# Patient Record
Sex: FEMALE | Race: WHITE | Employment: UNEMPLOYED | URBAN - METROPOLITAN AREA
[De-identification: names, ages, dates, MRNs, and addresses within clinical notes are randomized per-mention and may not be internally consistent; named-entity substitution may affect disease eponyms.]

---

## 2019-02-16 ENCOUNTER — HOSPITAL ENCOUNTER (OUTPATIENT)
Age: 59
Discharge: HOME | End: 2019-02-16

## 2021-11-25 ENCOUNTER — APPOINTMENT (OUTPATIENT)
Dept: CT IMAGING | Age: 61
End: 2021-11-25
Payer: COMMERCIAL

## 2021-11-25 ENCOUNTER — APPOINTMENT (OUTPATIENT)
Dept: GENERAL RADIOLOGY | Age: 61
End: 2021-11-25
Payer: COMMERCIAL

## 2021-11-25 ENCOUNTER — HOSPITAL ENCOUNTER (EMERGENCY)
Age: 61
Discharge: HOME | End: 2021-11-25
Payer: COMMERCIAL

## 2021-11-25 VITALS
RESPIRATION RATE: 16 BRPM | SYSTOLIC BLOOD PRESSURE: 146 MMHG | OXYGEN SATURATION: 100 % | WEIGHT: 110 LBS | HEIGHT: 66 IN | TEMPERATURE: 98.2 F | DIASTOLIC BLOOD PRESSURE: 93 MMHG | HEART RATE: 78 BPM | BODY MASS INDEX: 17.68 KG/M2

## 2021-11-25 DIAGNOSIS — K59.00 CONSTIPATION, UNSPECIFIED CONSTIPATION TYPE: ICD-10-CM

## 2021-11-25 DIAGNOSIS — F03.91 DEMENTIA WITH BEHAVIORAL DISTURBANCE, UNSPECIFIED DEMENTIA TYPE: ICD-10-CM

## 2021-11-25 DIAGNOSIS — R41.82 ALTERED MENTAL STATUS, UNSPECIFIED ALTERED MENTAL STATUS TYPE: Primary | ICD-10-CM

## 2021-11-25 DIAGNOSIS — R31.9 HEMATURIA, UNSPECIFIED TYPE: ICD-10-CM

## 2021-11-25 LAB
ACETAMINOPHEN LEVEL: <5 UG/ML (ref 15–30)
ALBUMIN SERPL-MCNC: 4.6 GM/DL (ref 3.4–5)
ALCOHOL SCREEN SERUM: <0.01 %WT/VOL
ALP BLD-CCNC: 52 IU/L (ref 40–128)
ALT SERPL-CCNC: 15 U/L (ref 10–40)
AMPHETAMINES: NEGATIVE
ANION GAP SERPL CALCULATED.3IONS-SCNC: 12 MMOL/L (ref 4–16)
AST SERPL-CCNC: 25 IU/L (ref 15–37)
BACTERIA: NEGATIVE /HPF
BARBITURATE SCREEN URINE: NEGATIVE
BASE EXCESS MIXED: 3.4 (ref 0–2.3)
BASOPHILS ABSOLUTE: 0.1 K/CU MM
BASOPHILS RELATIVE PERCENT: 0.7 % (ref 0–1)
BENZODIAZEPINE SCREEN, URINE: NEGATIVE
BILIRUB SERPL-MCNC: 0.5 MG/DL (ref 0–1)
BILIRUBIN URINE: NEGATIVE MG/DL
BLOOD, URINE: ABNORMAL
BUN BLDV-MCNC: 18 MG/DL (ref 6–23)
CALCIUM SERPL-MCNC: 9.4 MG/DL (ref 8.3–10.6)
CANNABINOID SCREEN URINE: NEGATIVE
CHLORIDE BLD-SCNC: 101 MMOL/L (ref 99–110)
CLARITY: CLEAR
CO2: 27 MMOL/L (ref 21–32)
COCAINE METABOLITE: NEGATIVE
COLOR: YELLOW
COMMENT: ABNORMAL
CREAT SERPL-MCNC: 0.8 MG/DL (ref 0.6–1.1)
DIFFERENTIAL TYPE: ABNORMAL
DOSE AMOUNT: ABNORMAL
DOSE AMOUNT: ABNORMAL
DOSE TIME: ABNORMAL
DOSE TIME: ABNORMAL
EKG ATRIAL RATE: 79 BPM
EKG DIAGNOSIS: NORMAL
EKG P-R INTERVAL: 114 MS
EKG Q-T INTERVAL: 356 MS
EKG QRS DURATION: 82 MS
EKG QTC CALCULATION (BAZETT): 408 MS
EKG R AXIS: 131 DEGREES
EKG T AXIS: 144 DEGREES
EKG VENTRICULAR RATE: 79 BPM
EOSINOPHILS ABSOLUTE: 0.1 K/CU MM
EOSINOPHILS RELATIVE PERCENT: 1.3 % (ref 0–3)
GFR AFRICAN AMERICAN: >60 ML/MIN/1.73M2
GFR NON-AFRICAN AMERICAN: >60 ML/MIN/1.73M2
GLUCOSE BLD-MCNC: 95 MG/DL (ref 70–99)
GLUCOSE, URINE: NEGATIVE MG/DL
HCO3 VENOUS: 28.5 MMOL/L (ref 19–25)
HCT VFR BLD CALC: 43 % (ref 37–47)
HEMOGLOBIN: 13.9 GM/DL (ref 12.5–16)
IMMATURE NEUTROPHIL %: 0.6 % (ref 0–0.43)
KETONES, URINE: NEGATIVE MG/DL
LACTATE: 2 MMOL/L (ref 0.4–2)
LEUKOCYTE ESTERASE, URINE: NEGATIVE
LYMPHOCYTES ABSOLUTE: 1.9 K/CU MM
LYMPHOCYTES RELATIVE PERCENT: 27.2 % (ref 24–44)
MCH RBC QN AUTO: 31.4 PG (ref 27–31)
MCHC RBC AUTO-ENTMCNC: 32.3 % (ref 32–36)
MCV RBC AUTO: 97.3 FL (ref 78–100)
MONOCYTES ABSOLUTE: 0.4 K/CU MM
MONOCYTES RELATIVE PERCENT: 5.6 % (ref 0–4)
MUCUS: ABNORMAL HPF
NITRITE URINE, QUANTITATIVE: NEGATIVE
NUCLEATED RBC %: 0 %
O2 SAT, VEN: 60.8 % (ref 50–70)
OPIATES, URINE: NEGATIVE
OXYCODONE: NEGATIVE
PCO2, VEN: 44 MMHG (ref 38–52)
PDW BLD-RTO: 12 % (ref 11.7–14.9)
PH VENOUS: 7.42 (ref 7.32–7.42)
PH, URINE: 8 (ref 5–8)
PHENCYCLIDINE, URINE: NEGATIVE
PLATELET # BLD: 263 K/CU MM (ref 140–440)
PMV BLD AUTO: 8.3 FL (ref 7.5–11.1)
PO2, VEN: 31 MMHG (ref 28–48)
POTASSIUM SERPL-SCNC: 4.5 MMOL/L (ref 3.5–5.1)
PROTEIN UA: NEGATIVE MG/DL
RBC # BLD: 4.42 M/CU MM (ref 4.2–5.4)
RBC URINE: 1 /HPF (ref 0–6)
SALICYLATE LEVEL: <0.3 MG/DL (ref 15–30)
SARS-COV-2, NAAT: NOT DETECTED
SEGMENTED NEUTROPHILS ABSOLUTE COUNT: 4.5 K/CU MM
SEGMENTED NEUTROPHILS RELATIVE PERCENT: 64.6 % (ref 36–66)
SODIUM BLD-SCNC: 140 MMOL/L (ref 135–145)
SOURCE: NORMAL
SPECIFIC GRAVITY UA: 1.01 (ref 1–1.03)
SQUAMOUS EPITHELIAL: <1 /HPF
T4 FREE: 1 NG/DL (ref 0.9–1.8)
TOTAL IMMATURE NEUTOROPHIL: 0.04 K/CU MM
TOTAL NUCLEATED RBC: 0 K/CU MM
TOTAL PROTEIN: 7.5 GM/DL (ref 6.4–8.2)
TRICHOMONAS: ABNORMAL /HPF
TROPONIN T: <0.01 NG/ML
TSH HIGH SENSITIVITY: 4.93 UIU/ML (ref 0.27–4.2)
UROBILINOGEN, URINE: NEGATIVE MG/DL (ref 0.2–1)
WBC # BLD: 7 K/CU MM (ref 4–10.5)
WBC UA: 1 /HPF (ref 0–5)

## 2021-11-25 PROCEDURE — 70450 CT HEAD/BRAIN W/O DYE: CPT

## 2021-11-25 PROCEDURE — 87040 BLOOD CULTURE FOR BACTERIA: CPT

## 2021-11-25 PROCEDURE — 84439 ASSAY OF FREE THYROXINE: CPT

## 2021-11-25 PROCEDURE — 87081 CULTURE SCREEN ONLY: CPT

## 2021-11-25 PROCEDURE — 85025 COMPLETE CBC W/AUTO DIFF WBC: CPT

## 2021-11-25 PROCEDURE — 84443 ASSAY THYROID STIM HORMONE: CPT

## 2021-11-25 PROCEDURE — 82805 BLOOD GASES W/O2 SATURATION: CPT

## 2021-11-25 PROCEDURE — 80053 COMPREHEN METABOLIC PANEL: CPT

## 2021-11-25 PROCEDURE — 36415 COLL VENOUS BLD VENIPUNCTURE: CPT

## 2021-11-25 PROCEDURE — G0480 DRUG TEST DEF 1-7 CLASSES: HCPCS

## 2021-11-25 PROCEDURE — 84484 ASSAY OF TROPONIN QUANT: CPT

## 2021-11-25 PROCEDURE — 93005 ELECTROCARDIOGRAM TRACING: CPT | Performed by: PHYSICIAN ASSISTANT

## 2021-11-25 PROCEDURE — 81001 URINALYSIS AUTO W/SCOPE: CPT

## 2021-11-25 PROCEDURE — 71250 CT THORAX DX C-: CPT

## 2021-11-25 PROCEDURE — 74176 CT ABD & PELVIS W/O CONTRAST: CPT

## 2021-11-25 PROCEDURE — 93010 ELECTROCARDIOGRAM REPORT: CPT | Performed by: INTERNAL MEDICINE

## 2021-11-25 PROCEDURE — 87430 STREP A AG IA: CPT

## 2021-11-25 PROCEDURE — 82375 ASSAY CARBOXYHB QUANT: CPT

## 2021-11-25 PROCEDURE — 80307 DRUG TEST PRSMV CHEM ANLYZR: CPT

## 2021-11-25 PROCEDURE — 71045 X-RAY EXAM CHEST 1 VIEW: CPT

## 2021-11-25 PROCEDURE — 83605 ASSAY OF LACTIC ACID: CPT

## 2021-11-25 PROCEDURE — 99285 EMERGENCY DEPT VISIT HI MDM: CPT

## 2021-11-25 PROCEDURE — 87635 SARS-COV-2 COVID-19 AMP PRB: CPT

## 2021-11-25 NOTE — ED PROVIDER NOTES
EMERGENCY DEPARTMENT [facility] EMERGENCY DEPARTMENT        TRIAGE CHIEF COMPLAINT:   Altered Mental Status      [place]:  Kelsey Carter is a 62 y.o. female that presents with EMS report of altered mental status. Per EMS patient had called 911 for a report of \"weird smell. \" Police were dispatched to the patient's home but were not able to find any abnormalities but patient was reportedly acting altered, having difficulty answering questions appropriately. EMS were dispatched and per EMS, patient was not able to answer any questions, had garbled speech. Patient's father was present at time of EMS arrival and had endorsed that patient is following with neurology in [place] but was not able to give them further details. On ED arrival, patient is self ambulatory to the room. On my evaluation, she is pleasant but confused, attempting to answer questions with intermittent expressive aphasia and difficulty word finding. She is alert and oriented to place and self but not to time. When asked what month is it is she responds \"Friday. \"  When asked what holiday is today she responds \"this week. \"  She often says \"I know the word but can't think of it. \"  I did call and speak with patient's father over the phone. Father states for the last year, patient has had progressively worsening confusion, similar to today's presentation. She has been seen by [facility] neurology and had an MRI but father was not aware of any abnormalities, they are awaiting insurance approval for a PET scan. Father states patient has also been seen by local psychiatry for possible early onset dementia. Father states patient is not on any medications at this time. She has noticed that patient's urine appears more cloudy and foul-smelling over the last several days. No fall or recent fever/chills, cough, changes in appetite. No anticoagulation use.   Father does state patient's confusion and difficulty word finding expressive aphasia intermittently is the same over the last 2-year without new or worsening features. Patient does endorse to me that she is having sore throat pain described as burning. \"  No difficulty swallowing at this time. She states \"maybe it was something I swallow. \"  She cannot recall calling 911 for her abnormal smell concerns initially. She is denying any shortness of breath, chest pain or abdominal pain. Father states that he has been evaluated by neurology as well as psychiatry without a definitive diagnosis for her worsening confusion over the last year. ROS: Limited from patient given her baseline confusion, provided by father. General:  No fevers   Cardiovascular:  No chest pain   Respiratory:  No shortness of breath, no cough   Gastrointestinal:  No pain,  no vomiting, no diarrhea  Musculoskeletal:  No muscle pain, no joint pain  Skin:  No rash   Neurologic: See HPI  Psychiatric:  No anxiety  Genitourinary:  See HPI  Extremities:  No edema    History reviewed. No pertinent past medical history. History reviewed. No pertinent surgical history. History reviewed. No pertinent family history.   Social History     Socioeconomic History    Marital status: Single     Spouse name: Not on file    Number of children: Not on file    Years of education: Not on file    Highest education level: Not on file   Occupational History    Not on file   Tobacco Use    Smoking status: Never Smoker    Smokeless tobacco: Never Used   Substance and Sexual Activity    Alcohol use: Not Currently    Drug use: Never    Sexual activity: Not on file   Other Topics Concern    Not on file   Social History Narrative    Not on file     Social Determinants of Health     Financial Resource Strain:     Difficulty of Paying Living Expenses: Not on file   Food Insecurity:     Worried About Running Out of Food in the Last Year: Not on file    Janey of Food in the Last Year: Not on file Transportation Needs:     Lack of Transportation (Medical): Not on file    Lack of Transportation (Non-Medical): Not on file   Physical Activity:     Days of Exercise per Week: Not on file    Minutes of Exercise per Session: Not on file   Stress:     Feeling of Stress : Not on file   Social Connections:     Frequency of Communication with Friends and Family: Not on file    Frequency of Social Gatherings with Friends and Family: Not on file    Attends Judaism Services: Not on file    Active Member of [address] [place] [address] [employer] or Organizations: Not on file    Attends Club or Organization Meetings: Not on file    Marital Status: Not on file   Intimate Partner Violence:     Fear of Current or Ex-Partner: Not on file    Emotionally Abused: Not on file    Physically Abused: Not on file    Sexually Abused: Not on file   Housing Stability:     Unable to Pay for Housing in the Last Year: Not on file    Number of [place] in the Last Year: Not on file    Unstable Housing in the Last Year: Not on file     No current facility-administered medications for this encounter. No current outpatient medications on file. No Known Allergies    Nursing Notes Reviewed  PHYSICAL EXAM    VITAL SIGNS: BP (!) 146/93   Pulse 78   Temp 98.2 °F (36.8 °C) (Oral)   Resp 16   Ht 5' 6\" (1.676 m)   Wt 110 lb (49.9 kg)   SpO2 100%   BMI 17.75 kg/m²    Constitutional:  Well developed, Well nourished, In no acute distress  Head:  Normocephalic, Atraumatic  Eyes: PERRL. EOMI. No nystagmus. Sclera clear. Conjunctiva normal, No discharge. Neck/Lymphatics: Supple, no JVD, Trachea is midline  Cardiovascular:  RRR, Normal S1 & S2     Peripheral Vascular: Distal pulses 2+, Capillary refill <2seconds  Respiratory:  Respirations nonnlabored, Clear to auscultation bilaterally, No retractions  Abdomen: Bowel sounds normal in all quadrants, Soft, Non tender/Nondistended, No palpable abdominal masses.   Musculoskeletal: No edema, No tenderness, No cyanosis, 5/5 strength bilaterally, BUE/BLE symmetrical without atrophy or deformities  Integument:  Warm, Dry, Intact, Skin turgor and texture normal  Neurologic:  Alert & oriented to self and place only, not to time. When asked what month and year it is she responds \"Friday. \"  When asked what holiday is today she responds \"this week. \"  Intermittent expressive aphasia and difficulty word finding and jumbled speech mixed with otherwise clear speech and clear answers. She does state \"I know where I am, I'm in the hospital because I called for them to bring me here\" but cannot recall why she initially called 911. No focal deficits noted. Cranial nerves II through XII grossly intact. No slurred speech. No facial droop. Finger to nose intact, rapid alternating movements intact. Normal gross motor coordination & motor strength bilateral upper and lower extremities. Upper and Lower extremities DTRs intact. Sensation intact. No pronator drift.  No gait ataxia  Psychiatric:  Affect appropriate      I have reviewed and interpreted all of the currently available lab results from this visit (if applicable):  Results for orders placed or performed during the hospital encounter of 11/25/21   COVID-19, Rapid    Specimen: Nasopharyngeal   Result Value Ref Range    Source THROAT     SARS-CoV-2, NAAT NOT DETECTED NOT DETECTED   Strep Screen Group A Throat    Specimen: Throat   Result Value Ref Range    Specimen THROAT     Special Requests NONE     Strep A Direct Screen NEGATIVE    CBC auto diff   Result Value Ref Range    WBC 7.0 4.0 - 10.5 K/CU MM    RBC 4.42 4.2 - 5.4 M/CU MM    Hemoglobin 13.9 12.5 - 16.0 GM/DL    Hematocrit 43.0 37 - 47 %    MCV 97.3 78 - 100 FL    MCH 31.4 (H) 27 - 31 PG    MCHC 32.3 32.0 - 36.0 %    RDW 12.0 11.7 - 14.9 %    Platelets 173 878 - 149 K/CU MM    MPV 8.3 7.5 - 11.1 FL    Differential Type AUTOMATED DIFFERENTIAL     Segs Relative 64.6 36 - 66 %    Lymphocytes % 27.2 24 - 44 % Monocytes % 5.6 (H) 0 - 4 %    Eosinophils % 1.3 0 - 3 %    Basophils % 0.7 0 - 1 %    Segs Absolute 4.5 K/CU MM    Lymphocytes Absolute 1.9 K/CU MM    Monocytes Absolute 0.4 K/CU MM    Eosinophils Absolute 0.1 K/CU MM    Basophils Absolute 0.1 K/CU MM    Nucleated RBC % 0.0 %    Total Nucleated RBC 0.0 K/CU MM    Total Immature Neutrophil 0.04 K/CU MM    Immature Neutrophil % 0.6 (H) 0 - 0.43 %   CMP   Result Value Ref Range    Sodium 140 135 - 145 MMOL/L    Potassium 4.5 3.5 - 5.1 MMOL/L    Chloride 101 99 - 110 mMol/L    CO2 27 21 - 32 MMOL/L    BUN 18 6 - 23 MG/DL    CREATININE 0.8 0.6 - 1.1 MG/DL    Glucose 95 70 - 99 MG/DL    Calcium 9.4 8.3 - 10.6 MG/DL    Albumin 4.6 3.4 - 5.0 GM/DL    Total Protein 7.5 6.4 - 8.2 GM/DL    Total Bilirubin 0.5 0.0 - 1.0 MG/DL    ALT 15 10 - 40 U/L    AST 25 15 - 37 IU/L    Alkaline Phosphatase 52 40 - 128 IU/L    GFR Non-African American >60 >60 mL/min/1.73m2    GFR African American >60 >60 mL/min/1.73m2    Anion Gap 12 4 - 16   Troponin   Result Value Ref Range    Troponin T <0.010 <0.01 NG/ML   Lactic Acid, Plasma   Result Value Ref Range    Lactate 2.0 0.4 - 2.0 mMOL/L   Blood Gas, Venous   Result Value Ref Range    pH, Alexandru 7.42 7.32 - 7.42    pCO2, Alexandru 44 38 - 52 mmHG    pO2, Alexandru 31 28 - 48 mmHG    Base Exc, Mixed 3.4 (H) 0 - 2.3    HCO3, Venous 28.5 (H) 19 - 25 MMOL/L    O2 Sat, Alexandru 60.8 50 - 70 %    Comment VBG    Urinalysis   Result Value Ref Range    Color, UA YELLOW YELLOW    Clarity, UA CLEAR CLEAR    Glucose, Urine NEGATIVE NEGATIVE MG/DL    Bilirubin Urine NEGATIVE NEGATIVE MG/DL    Ketones, Urine NEGATIVE NEGATIVE MG/DL    Specific Gravity, UA 1.015 1.001 - 1.035    Blood, Urine LARGE (A) NEGATIVE    pH, Urine 8.0 5.0 - 8.0    Protein, UA NEGATIVE NEGATIVE MG/DL    Urobilinogen, Urine NEGATIVE 0.2 - 1.0 MG/DL    Nitrite Urine, Quantitative NEGATIVE NEGATIVE    Leukocyte Esterase, Urine NEGATIVE NEGATIVE    RBC, UA 1 0 - 6 /HPF    WBC, UA 1 0 - 5 /HPF Bacteria, UA NEGATIVE NEGATIVE /HPF    Squam Epithel, UA <1 /HPF    Mucus, UA RARE (A) NEGATIVE HPF    Trichomonas, UA NONE SEEN NONE SEEN /HPF   Acetaminophen Level   Result Value Ref Range    Acetaminophen Level <5.0 (L) 15 - 30 ug/ml    DOSE AMOUNT DOSE AMT. GIVEN - UNKNOWN     DOSE TIME DOSE TIME GIVEN - UNKNOWN    Salicylate   Result Value Ref Range    Salicylate Lvl <9.0 (L) 15 - 30 MG/DL    DOSE AMOUNT DOSE AMT. GIVEN - UNKNOWN     DOSE TIME DOSE TIME GIVEN - UNKNOWN    Urine Drug Screen   Result Value Ref Range    Cannabinoid Scrn, Ur NEGATIVE NEGATIVE    Amphetamines NEGATIVE NEGATIVE    Cocaine Metabolite NEGATIVE NEGATIVE    Benzodiazepine Screen, Urine NEGATIVE NEGATIVE    Barbiturate Screen, Ur NEGATIVE NEGATIVE    Opiates, Urine NEGATIVE NEGATIVE    Phencyclidine, Urine NEGATIVE NEGATIVE    Oxycodone NEGATIVE NEGATIVE   Ethanol   Result Value Ref Range    Alcohol Scrn <0.01 <0.01 %WT/VOL   TSH without Reflex   Result Value Ref Range    TSH, High Sensitivity 4.930 (H) 0.270 - 4.20 uIu/ml   T4, free   Result Value Ref Range    T4 Free 1.00 0.9 - 1.8 NG/DL   EKG 12 Lead   Result Value Ref Range    Ventricular Rate 79 BPM    Atrial Rate 79 BPM    P-R Interval 114 ms    QRS Duration 82 ms    Q-T Interval 356 ms    QTc Calculation (Bazett) 408 ms    R Axis 131 degrees    T Axis 144 degrees    Diagnosis       Suspect arm lead reversal, interpretation assumes no reversal  Normal sinus rhythm  Lateral infarct , age undetermined  Abnormal ECG  No previous ECGs available          Radiographs (if obtained):  [] The following radiograph was interpreted by myself in the absence of a radiologist:   [] Radiologist's Report Reviewed:  CT ABDOMEN PELVIS WO CONTRAST Additional Contrast? None   Preliminary Result   1. No acute findings within the chest, abdomen or pelvis. 2. Irregular peripheral left upper lobe pulmonary opacity with focal pleural   reaction.   The appearance is most consistent with a postinflammatory process,   perhaps related to prior infection or infarct. This accounts for the   abnormality on the chest x-ray. Radiographic follow-up recommended within   3-6 months to assess stability. 3. Moderate colonic stool burden, suggestive of constipation. CT CHEST WO CONTRAST   Preliminary Result   1. No acute findings within the chest, abdomen or pelvis. 2. Irregular peripheral left upper lobe pulmonary opacity with focal pleural   reaction. The appearance is most consistent with a postinflammatory process,   perhaps related to prior infection or infarct. This accounts for the   abnormality on the chest x-ray. Radiographic follow-up recommended within   3-6 months to assess stability. 3. Moderate colonic stool burden, suggestive of constipation. CT HEAD WO CONTRAST   Final Result   No acute intracranial abnormality. XR CHEST PORTABLE   Preliminary Result   1. No acute cardiopulmonary process. 2. Indeterminate, irregular, nodular opacity involving the left upper lobe. Chest CT correlation is recommended to exclude pulmonary nodule.                   CT ABDOMEN PELVIS WO CONTRAST Additional Contrast? None (Preliminary result)  Result time 11/25/21 12:03:55  Preliminary result by Yareli Viera MD (11/25/21 12:03:55)                Impression:    1. No acute findings within the chest, abdomen or pelvis. 2. Irregular peripheral left upper lobe pulmonary opacity with focal pleural   reaction.  The appearance is most consistent with a postinflammatory process,   perhaps related to prior infection or infarct.  This accounts for the   abnormality on the chest x-ray.  Radiographic follow-up recommended within   3-6 months to assess stability. 3. Moderate colonic stool burden, suggestive of constipation.              Narrative:    EXAMINATION:   CT OF THE CHEST WITHOUT CONTRAST; CT OF THE ABDOMEN AND PELVIS WITHOUT   CONTRAST 11/25/2021 10:45 am     TECHNIQUE:   CT of the chest was performed without the administration of intravenous   contrast. Multiplanar reformatted images are provided for review. Dose   modulation, iterative reconstruction, and/or weight based adjustment of the   mA/kV was utilized to reduce the radiation dose to as low as reasonably   achievable.; CT of the abdomen and pelvis was performed without the   administration of intravenous contrast. Multiplanar reformatted images are   provided for review. Dose modulation, iterative reconstruction, and/or weight   based adjustment of the mA/kV was utilized to reduce the radiation dose to as   low as reasonably achievable. COMPARISON:   None     HISTORY:   ORDERING SYSTEM PROVIDED HISTORY: AMS, lung nodule of cxr   TECHNOLOGIST PROVIDED HISTORY:   Reason for exam:->AMS, lung nodule of cxr   Decision Support Exception - unselect if not a suspected or confirmed   emergency medical condition->Emergency Medical Condition (MA)   Reason for Exam: AMS, lung nodule of cxr   Acuity: Acute   Type of Exam: Initial     FINDINGS:   Mediastinum: Heart is normal in size.  No pericardial effusion is present. The thoracic aorta is normal in caliber.  No intrathoracic lymphadenopathy is   present by CT size criteria. Lungs/pleura: No evidence of acute infiltrate, pleural effusion, or   pneumothorax.  There is scarring at the bilateral lung apices.  Subpleural,   irregular opacity involving the left upper lobe, image 31, with associated   focal pleural reaction.  This is likely postinflammatory.  This accounts for   the nodular density noted on the recent radiograph.  Additionally, there are   calcified granulomata present involving the left upper lobe.  The central   airways are patent.      Abdomen:  No acute abnormality is identified involving the unenhanced liver,   spleen, pancreas, adrenal glands or kidneys.  Evaluation is somewhat limited   by lack of contrast, as well as prominent beam-hardening artifacts.  The   small bowel is normal in caliber.  Moderate colonic stool burden is noted. No focal inflammatory process is seen involving the GI tract.  The abdominal   aorta is normal in caliber.  No intraperitoneal free air or free fluid is   identified. Pelvis:  The urinary bladder and distal ureters are unremarkable.  No   significant free fluid is seen in the pelvis.  No pelvic lymphadenopathy.  No   pelvic mass is identified on this noncontrast exam.     Soft Tissues/Bones: No evidence of acute osseous abnormality.                 Preliminary result by Gigi Woodward MD (11/25/21 11:14:44)                Impression:    1. No acute findings within the chest, abdomen or pelvis   2. Irregular peripheral left upper lobe pulmonary opacity with focal pleural   reaction.  Appearance is most consistent with postinflammatory process,   perhaps related to prior infection or infarct.  This accounts for the   abnormality on the chest x-ray.  Radiographic follow-up recommended within   3-6 months to assess stability. 3. Moderate colonic stool burden, suggestive of constipation                       CT CHEST WO CONTRAST (Preliminary result)  Result time 11/25/21 12:03:55  Preliminary result by Gigi Woodward MD (11/25/21 12:03:55)                Impression:    1. No acute findings within the chest, abdomen or pelvis. 2. Irregular peripheral left upper lobe pulmonary opacity with focal pleural   reaction.  The appearance is most consistent with a postinflammatory process,   perhaps related to prior infection or infarct.  This accounts for the   abnormality on the chest x-ray.  Radiographic follow-up recommended within   3-6 months to assess stability. 3. Moderate colonic stool burden, suggestive of constipation.              Narrative:    EXAMINATION:   CT OF THE CHEST WITHOUT CONTRAST; CT OF THE ABDOMEN AND PELVIS WITHOUT   CONTRAST 11/25/2021 10:45 am     TECHNIQUE:   CT of the chest was performed without the administration of intravenous   contrast. Multiplanar reformatted images are provided for review. Dose   modulation, iterative reconstruction, and/or weight based adjustment of the   mA/kV was utilized to reduce the radiation dose to as low as reasonably   achievable.; CT of the abdomen and pelvis was performed without the   administration of intravenous contrast. Multiplanar reformatted images are   provided for review. Dose modulation, iterative reconstruction, and/or weight   based adjustment of the mA/kV was utilized to reduce the radiation dose to as   low as reasonably achievable. COMPARISON:   None     HISTORY:   ORDERING SYSTEM PROVIDED HISTORY: AMS, lung nodule of cxr   TECHNOLOGIST PROVIDED HISTORY:   Reason for exam:->AMS, lung nodule of cxr   Decision Support Exception - unselect if not a suspected or confirmed   emergency medical condition->Emergency Medical Condition (MA)   Reason for Exam: AMS, lung nodule of cxr   Acuity: Acute   Type of Exam: Initial     FINDINGS:   Mediastinum: Heart is normal in size.  No pericardial effusion is present. The thoracic aorta is normal in caliber.  No intrathoracic lymphadenopathy is   present by CT size criteria. Lungs/pleura: No evidence of acute infiltrate, pleural effusion, or   pneumothorax.  There is scarring at the bilateral lung apices.  Subpleural,   irregular opacity involving the left upper lobe, image 31, with associated   focal pleural reaction.  This is likely postinflammatory.  This accounts for   the nodular density noted on the recent radiograph.  Additionally, there are   calcified granulomata present involving the left upper lobe.  The central   airways are patent.      Abdomen:  No acute abnormality is identified involving the unenhanced liver,   spleen, pancreas, adrenal glands or kidneys.  Evaluation is somewhat limited   by lack of contrast, as well as prominent beam-hardening artifacts.  The   small bowel is normal in caliber.  Moderate colonic stool burden is noted. No focal inflammatory process is seen involving the GI tract.  The abdominal   aorta is normal in caliber.  No intraperitoneal free air or free fluid is   identified. Pelvis:  The urinary bladder and distal ureters are unremarkable.  No   significant free fluid is seen in the pelvis.  No pelvic lymphadenopathy.  No   pelvic mass is identified on this noncontrast exam.     Soft Tissues/Bones: No evidence of acute osseous abnormality.                 Preliminary result by Anna Mckeon MD (11/25/21 11:14:44)                Impression:    1. No acute findings within the chest, abdomen or pelvis   2. Irregular peripheral left upper lobe pulmonary opacity with focal pleural   reaction.  Appearance is most consistent with postinflammatory process,   perhaps related to prior infection or infarct.  This accounts for the   abnormality on the chest x-ray.  Radiographic follow-up recommended within   3-6 months to assess stability. 3. Moderate colonic stool burden, suggestive of constipation                       CT HEAD WO CONTRAST (Final result)  Result time 11/25/21 09:49:35  Final result by Anna Mckeon MD (11/25/21 09:49:35)                Impression:    No acute intracranial abnormality. Narrative:    EXAMINATION:   CT OF THE HEAD WITHOUT CONTRAST  11/25/2021 9:43 am     TECHNIQUE:   CT of the head was performed without the administration of intravenous   contrast. Dose modulation, iterative reconstruction, and/or weight based   adjustment of the mA/kV was utilized to reduce the radiation dose to as low   as reasonably achievable. COMPARISON:   None. HISTORY:   ORDERING SYSTEM PROVIDED HISTORY: AMS   TECHNOLOGIST PROVIDED HISTORY:   Has a \"code stroke\" or \"stroke alert\" been called? ->No   Reason for exam:->[facility]   Decision Support Exception - unselect if not a suspected or confirmed   emergency medical condition->Emergency Medical Condition (MA)   Reason for Exam: AMS Acuity: Acute   Type of Exam: Initial     FINDINGS:   BRAIN/VENTRICLES: There is no acute intracranial hemorrhage, mass effect or   midline shift.  No abnormal extra-axial fluid collection.  The gray-white   differentiation is maintained without evidence of an acute infarct.  There is   no evidence of hydrocephalus. ORBITS: The visualized portion of the orbits demonstrate no acute abnormality. SINUSES: The visualized paranasal sinuses and mastoid air cells demonstrate   no acute abnormality. SOFT TISSUES/SKULL:  No acute abnormality of the visualized skull or soft   tissues.                       XR CHEST PORTABLE (Preliminary result)  Result time 11/25/21 10:04:34  Preliminary result by Sher Orellana MD (11/25/21 10:04:34)                Impression:    1. No acute cardiopulmonary process. 2. Indeterminate, irregular, nodular opacity involving the left upper lobe. Chest CT correlation is recommended to exclude pulmonary nodule. Narrative:    EXAMINATION:   ONE XRAY VIEW OF THE CHEST     11/25/2021 9:19 am     COMPARISON:   None     HISTORY:   ORDERING SYSTEM PROVIDED HISTORY: AMS   TECHNOLOGIST PROVIDED HISTORY:   Reason for exam:->AMS   Reason for Exam:  ams     Initial evaluation, acute altered level of consciousness     FINDINGS:   The cardiomediastinal silhouette is normal in size. The lungs are clear without acute infiltrate. There is an irregular nodular opacity involving the left upper lobe. No pleural effusion or pneumothorax.                 Preliminary result by Sher Orellana MD (11/25/21 09:50:49)                Impression:    1. No acute cardiopulmonary process   2. Indeterminate, irregular nodular opacity involving the left upper lobe.    Chest CT correlation is recommended to exclude pulmonary nodule.                       EKG Interpretation  Please see ED physician's note - Dr. María Méndez - for EKG interpretation        Chart review shows recent radiographs:  No results found. ED COURSE & MEDICAL DECISION MAKING       Vital signs and nursing notes reviewed during ED course. I have independently evaluated this patient . Supervising physician - Dr. Rosalind Perdomo - was present in ED and available for consultation throughout entirety of patient's care. All pertinent Lab data and radiographic results reviewed with patient at bedside. The patient and/or the family were informed of the results of any tests/labs/imaging, the treatment plan, and time was allotted to answer questions. Clinical Impression:  1. Altered mental status, unspecified altered mental status type    2. Dementia with behavioral disturbance, unspecified dementia type ([facility] [address]    3. Constipation, unspecified constipation type    4. Hematuria, unspecified type        Patient presents with concern for sore throat as well as family concern for altered mental status. On exam, pleasantly confused but cooperative 58-year-old female, self ambulatory through EMS doors. She is alert and oriented to self and place only but not to time. She intermittently has jumbled speech, difficulty word finding as well as expressive aphasia but also has episodes of very clear understandable speech. He is following commands. Equal strength DTRs range of motion sensation in the bilateral upper and lower extremities. Ambulating with a steady gait without ataxia. After speaking with patient's father over the phone, this has appeared to be patient's baseline mental status and she has no lateralizing weakness in the extremities so stroke alert was not called. BC, CMP with a significant derangement. Normal white count hemoglobin. No electrolyte disturbance. Lactic is normal at 2. Troponin is normal.  Tylenol, aspirin alcohol levels are negative. Thyroid panel feels an elevated TSH but normal free T4. Venous blood gas shows normal pH with normal PCO2. Carboxyhemoglobin per RT notes is 3.0.   CT head shows no evidence of acute intracranial process. Chest x-ray also negative for evidence of consolidation or acute cardiopulmonary process but there is an indeterminate irregular nodule opacity involving the left upper lobe recommending for CT chest for further evaluation. UA shows large gross blood but only one red blood cell, negative for bacteria leukocytes and nitrites. Is denying any urinary complaints or flank pain. We'll plan to go forward with noncontrast imaging chest and abdomen to rule out causes for asymptomatic gross hematuria versus abnormal chest x-ray findings. CT chest and abdomen pelvis noncontrast imaging reveals no acute findings however there is irregular peripheral left upper lobe pulmonary opacity with focal pleural reaction consistent with postinflammatory process and recommending for repeat imaging in 3 to 6 months. There is also evidence of moderate colonic stool burden/constipation. At this time, there is no evidence of acute intracranial process or infectious process requiring admission but will plan to consult with psychiatry, Dr. Juan Diego Damon as patient may be a good candidate for [facility] as presentation could be concerning for worsening dementia/Alzheimer with behavioral disturbance. I did consult with Dr. Danny Saldana - psychiatry - and discussed patient's history, ED presentation/course including any pertinent laboratory findings and imaging study findings. He/she did come to the ED to evaluate patient. Does agree that presentation is concerning for dementia versus Alzheimer's with behavioral disturbance and does accept patient [facility]. While awaiting transfer, patient's father does come to the emergency department. He does state that patient's presentation is similar to baseline for the last year and he is concerned that if patient is admitted to [address] [place] [address], she will miss her appointment is coming morning at 9 AM with stability office.   Per this letter which was copied and placed in patient's chart, she will be evaluated by psychiatry for mental health status as well as memory test in order to receive disability benefits. Father would like to take patient home and accept any responsibility for the patient so that she would not miss this appointment. I did call and speak with Dr. Mirian Rasheed who is comfortable with patient being discharged to father's care and states patient/father can return either to [facility] or [facility] ED after this appointment or if her symptoms worsen to be admitted back to [facility]. This plan was discussed with patient father who understands and agrees. I given him a low threshold to return patient back to the ED for any new worsening symptoms or should they change their mind about being admitted to behavioral unit. Low clinical suspicion for acute intracranial process, CVA/TIA, bacteremia/sepsis, pneumonia, UTI requiring admission/antibiotics. Patient is discharged to father's care. Return warnings again discussed. Diagnosis and plan discussed in detail with patient who understands and agrees. Patient agrees to return emergency department if symptoms worsen or any new symptoms develop. Disposition referral (if applicable):  No follow-up provider specified. Disposition medications (if applicable): There are no discharge medications for this patient.         (Please note that portions of this note may have been completed with a voice recognition program. Efforts were made to edit the dictations but occasionally words are mis-transcribed.)          Delvin Espinal PA-C  11/25/21 3911

## 2021-11-25 NOTE — ED NOTES
After provider speaking with patients father he want to take patient home d/t appointment on Monday for SSDI claim. Stating if she does not make this appointment she will have to start all aover with another 90 days and she has no income. Paul Cantu.  MARNI Singer  11/25/21 5713

## 2021-11-25 NOTE — ED PROVIDER NOTES
12 lead EKG per my interpretation:  Normal Sinus Rhythm 81  Axis is   Normal  QTc is  455  There is specific T wave changes appreciated. There is no specific ST wave changes appreciated.     Prior EKG to compare with was not available         [place], DO  11/25/21 0910

## 2021-11-25 NOTE — ED NOTES
Bed: ED-29  Expected date: 11/25/21  Expected time:   Means of arrival: [facility] Department  Comments:     Edd Singer RN  11/25/21 68[facility]) Employee Diabetes Program  ==========================================  Blair Guillaume is a 62 y.o. female enrolled in the [address] [place] [address] Diabetes Program.    Medications:  Current Outpatient Medications   Medication Sig Notes    atenolol (TENORMIN) 25 MG tablet 25 mg TWICE daily     flecainide (TAMBOCOR) 100 MG tablet Take 1 tablet by mouth 2 times daily     enalapril (VASOTEC) 2.5 MG tablet Take 1 tablet by mouth nightly     Fluticasone Furoate-Vilanterol (BREO ELLIPTA) 200-25 MCG/INH AEPB Inhale 1 puff into the lungs daily     simvastatin (ZOCOR) 40 MG tablet Take 1 tablet by mouth nightly     vitamin D (ERGOCALCIFEROL) 09434 units CAPS capsule Take 1 capsule by mouth once a week     gabapentin (NEURONTIN) 100 MG capsule Take 3 capsules by mouth 2 times daily for 90 days. Shira Ledezma insulin glargine (LANTUS SOLOSTAR) 100 UNIT/ML injection pen Inject 20 Units into the skin daily     Insulin Pen Needle (BD PEN NEEDLE FLOYD U/F) 32G X 4 MM MISC USE AS DIRECTED     sertraline (ZOLOFT) 50 MG tablet Take 1 tablet by mouth daily     SITagliptin (JANUVIA) 100 MG tablet Take 1 tablet by mouth daily     metFORMIN (GLUCOPHAGE) 1000 MG tablet TAKE ONE TABLET BY MOUTH TWICE A DAY WITH MEALS     VICTOZA 18 MG/3ML SOPN SC injection INJECT 1.8 MG INTO THE SKIN DAILY     pyridoxine (RA VITAMIN B-6) 50 MG tablet Take 2 tablets by mouth daily     lansoprazole (PREVACID) 30 MG delayed release capsule Take 1 capsule by mouth daily Takes prn heartburn. Uses about once weekly per patient    fluticasone (VERAMYST) 27.5 MCG/SPRAY nasal spray 1 spray by Nasal route 2 times daily     ibuprofen (ADVIL;MOTRIN) 200 MG tablet Take 200 mg by mouth every 6 hours as needed for Pain or Fever 400 mg Couple times per week. Discussed possible side effects of this medication and long term use. Also discussed trying Tylenol for pain.     hydrochlorothiazide (HYDRODIURIL) 25 MG tablet Take 1 tablet by mouth daily     albuterol sulfate HFA (PROAIR HFA) 108 (90 Base) MCG/ACT inhaler Inhale 2 puffs into the lungs every 4 hours as needed for Wheezing or Shortness of Breath      [facility]. She states she has been transferring most things to Mail Order for cost savings  Current testing supplies/frequency: [employer]. Discussed switching to [employer] for cost savings and she would like to stay with [employer] for now.  Knows she can contact pharmacy team for assistance in switching in the future if she would like to  Pen needles/syringes: getting through [facility] for free    Allergies:  No Known Allergies   Vitals/Labs:  BP Readings from Last 3 Encounters:   03/29/19 110/60   12/10/18 116/68   09/19/18 108/60     Lab Results   Component Value Date    LABMICR <12.0 12/10/2018     Lab Results   Component Value Date    LABA1C 5.7 (H) 12/10/2018    LABA1C 6.8 (H) 06/13/2018    LABA1C 6.2 (H) 12/10/2017     Lab Results   Component Value Date    CHOL 163 12/10/2018    TRIG 164 (H) 12/10/2018    HDL 41 12/10/2018    LDLCALC 89 12/10/2018     ALT   Date Value Ref Range Status   12/10/2018 20 0 - 32 U/L Final     AST   Date Value Ref Range Status   12/10/2018 20 0 - 31 U/L Final     The 10-year ASCVD risk score (Yusuf Barajas, et al., 2013) is: 4.9%    Values used to calculate the score:      Age: 62 years      Sex: Female      Is Non- : No      Diabetic: Yes      Tobacco smoker: No      Systolic Blood Pressure: 221 mmHg      Is BP treated: No      HDL Cholesterol: 41 mg/dL      Total Cholesterol: 163 mg/dL     Lab Results   Component Value Date    CREATININE 0.7 12/10/2018     eGFR: > 60 mL/min/1.73 m^2    Immunizations:  Immunization History   Administered Date(s) Administered    Influenza Virus Vaccine 11/19/2018    Pneumococcal Polysaccharide ([id number]) 11/14/2017    Tdap (Boostrix, Adacel) 11/14/2017      Social History:  Social History     Tobacco Use    Smoking status: Former Smoker     Packs/day: 1.00     Types: Cigarettes     Start date: 1977     Last attempt to quit: 2006     Years since quittin.8    Smokeless tobacco: Never Used    Tobacco comment: quit 2006   Substance Use Topics    Alcohol use: Yes     Comment: on occas     ASSESSMENT:  Initial Program Requirements (Y indicates has completed for the year, N indicates needs to be completed by 19): No - OV with provider for DM (1st) upcoming on 19  No - A1c (1st)  Yes - On statin or contraindication(s) simvastatin 40 mg tablet  Yes - On ACEi/ARB or contraindication(s) enalapril 2.5 mg tablet      Ongoing Program Requirements (Y indicates has completed for the year, N indicates needs to be completed by 19): No - OV with provider for DM (2nd)  No - ACC/diabetes educator visit (if A1c over 8%) 5.7% 12/10/18  No - A1c (2nd)  No - Lipid panel  No - Urine microalbumin  Yes - Pneumococcal vaccination: up to date  No - Influenza vaccination for 2019  Yes - Medication adherence over 70%    Formulary Medication Review:  Non-formulary or medications with cost-effective alternatives: Discussed with patient that she could be saving more money by transferring other medications that could be waived copays to mail order, such as her enalapril and Januvia she has been filling at site pharmacy. She thanked me for this information. States she has been slowing transferring things to mail order pharmacy and that she will likely transfer these over. Asked patient if she would like any assistance in getting these medications to the mail order pharmacy and she declines at this time. Current medications eligible for copay waiver, up to $600, through [facility] ([facility] (mail order) Pharmacy:  - enalapril, Lantus, Januvia, simvastatin, Victoza, metformin  - Generic ([facility] pharmacy-stocked) insulin syringes and pen needles  - [employer] or [employer] meter and supplies     Diabetes Care:   - Glycemic Goal: <7.0%.  Is at blood glucose goal.  - Duplicate MOA: Januvia and Victoza  - De-escalation of Therapy:  - Any episodes of hypoglycemia? yes - States this might happen a couple times her month. When she gets busy and forgets to eat. She gets signs and symptoms. States the lowest she's had was 45 about 4 months ago. States she will see it occasionally around 60-70. Treatment of hypoglycemia (low blood sugars) reviewed with patient: if sugar is below 80 mg/dL, treat with 15 grams of simple sugar [rapid acting carb] (3-4 glucose tablets, 4 oz of regular juice, 1/2 can of pop, 5 sugar-containing candies, 1 Tablespoon of honey). RECHECK in 15 minutes. If above 80 mg/dL, have small meal or snack. If not above 80 mg/dL, repeat the 15 grams of simple carbs until above 80 mg/dL. - Eye exam current (within one year): no States it has been a couple years. Encouraged her to follow up with this. - Foot exam current (within one year): no   - Medication compliance: compliant most of the time  - Diet compliance: states she is trying to watch her diet and she has lost some weight lately  - Current exercise: States she is on her feet all day and often does not feel like additional exercise     Other Considerations:  - Blood Pressure Goal: BP less than 140/90 mmHg due to history of DM: Is at blood pressure goal.   - Lipids: Patient is prescribed moderate-intensity statin therapy. - Smoking status: former smoker    PLAN:  Patient plans to switch all medications over to mail order for extra cost savings. Declines assistance in doing this at this time  - Consideration(s) for provider:   · Would recommend discontinuing Januvia since patient is on Januvia and Victoza. Both agents are GLP-1 based therapies (DPP-4 inhibitors inhibit DPP-4 activity which increases incretin (GLP-1, GIP) concentrations and GLP-1s activate the GLP-1 receptors).  Since they work on the same pharmacologic pathway to ultimately increase the activity of GLP-1, it is not generally recommended to combine these classes as there do not appear to be additive effects on glucose lowering. · Patient's last A1c on record was 5.7% on 12/10/18. She has upcoming appointment 4/29/19. She states that she feels hypoglycemic a couple times per month and will occasionally get blood glucoses in the 60-70s. Encouraged patient to continue to monitor BG and bring these readings to appointment with Dr. Silvano Harvey next week- insulin may need titrated downward. - DM program gaps identified:   · Initial requirements: OV with provider for DM (1st) and A1c (1st)   · Ongoing requirements: OV with provider for DM (2nd), A1c (2nd), Lipid panel, Urine microalbumin, Influenza vaccination for 2016-4937 and Medication adherence over 70%   - Education to patient: Encouraged aerobic exercise. Discussed foot care.   Reminded to get yearly retinal exam.  Discussed ways to avoid symptomatic hypoglycemia.   - Follow up: PCP for identified gaps or as scheduled below  - Upcoming appointments:   Future Appointments   Date Time Provider Kory Courtney   4/29/2019  2:30 PM DO BONI Noriega   6/6/2019  7:30 AM MD Jillian Rodriguez [address], [address] [facility], PharmD  [address] [facility]   Phone: C: [phone]  O: [address] [place] [address] free: [phone], option 7

## 2021-11-27 LAB
CULTURE: NORMAL
Lab: NORMAL
SPECIMEN: NORMAL
STREP A DIRECT SCREEN: NEGATIVE

## 2021-11-30 LAB
CULTURE: NORMAL
CULTURE: NORMAL
Lab: NORMAL
Lab: NORMAL
SPECIMEN: NORMAL
SPECIMEN: NORMAL

## 2022-03-26 ENCOUNTER — APPOINTMENT (OUTPATIENT)
Dept: CT IMAGING | Age: 62
End: 2022-03-26
Payer: COMMERCIAL

## 2022-03-26 ENCOUNTER — APPOINTMENT (OUTPATIENT)
Dept: GENERAL RADIOLOGY | Age: 62
End: 2022-03-26
Payer: COMMERCIAL

## 2022-03-26 ENCOUNTER — HOSPITAL ENCOUNTER (EMERGENCY)
Age: 62
Discharge: HOME | End: 2022-03-28
Attending: STUDENT IN AN ORGANIZED HEALTH CARE EDUCATION/TRAINING PROGRAM
Payer: COMMERCIAL

## 2022-03-26 DIAGNOSIS — R41.3 MEMORY LOSS OR IMPAIRMENT: ICD-10-CM

## 2022-03-26 DIAGNOSIS — F03.91 DEMENTIA WITH BEHAVIORAL DISTURBANCE, UNSPECIFIED DEMENTIA TYPE: Primary | ICD-10-CM

## 2022-03-26 LAB
ACETAMINOPHEN LEVEL: <5 UG/ML (ref 15–30)
ALBUMIN SERPL-MCNC: 4.2 GM/DL (ref 3.4–5)
ALCOHOL SCREEN SERUM: <0.01 %WT/VOL
ALP BLD-CCNC: 56 IU/L (ref 40–129)
ALT SERPL-CCNC: 17 U/L (ref 10–40)
AMMONIA: 12 UMOL/L (ref 11–51)
AMPHETAMINES: NEGATIVE
ANION GAP SERPL CALCULATED.3IONS-SCNC: 13 MMOL/L (ref 4–16)
AST SERPL-CCNC: 26 IU/L (ref 15–37)
BACTERIA: NEGATIVE /HPF
BARBITURATE SCREEN URINE: NEGATIVE
BASOPHILS ABSOLUTE: 0.1 K/CU MM
BASOPHILS RELATIVE PERCENT: 0.6 % (ref 0–1)
BENZODIAZEPINE SCREEN, URINE: NEGATIVE
BILIRUB SERPL-MCNC: 0.3 MG/DL (ref 0–1)
BILIRUBIN URINE: NEGATIVE MG/DL
BLOOD, URINE: ABNORMAL
BUN BLDV-MCNC: 19 MG/DL (ref 6–23)
CALCIUM SERPL-MCNC: 9.2 MG/DL (ref 8.3–10.6)
CANNABINOID SCREEN URINE: NEGATIVE
CHLORIDE BLD-SCNC: 100 MMOL/L (ref 99–110)
CLARITY: CLEAR
CO2: 26 MMOL/L (ref 21–32)
COCAINE METABOLITE: NEGATIVE
COLOR: YELLOW
CREAT SERPL-MCNC: 0.8 MG/DL (ref 0.6–1.1)
DIFFERENTIAL TYPE: ABNORMAL
DOSE AMOUNT: ABNORMAL
DOSE AMOUNT: ABNORMAL
DOSE TIME: ABNORMAL
DOSE TIME: ABNORMAL
EOSINOPHILS ABSOLUTE: 0.1 K/CU MM
EOSINOPHILS RELATIVE PERCENT: 0.9 % (ref 0–3)
GFR AFRICAN AMERICAN: >60 ML/MIN/1.73M2
GFR NON-AFRICAN AMERICAN: >60 ML/MIN/1.73M2
GLUCOSE BLD-MCNC: 97 MG/DL (ref 70–99)
GLUCOSE, URINE: NEGATIVE MG/DL
HCT VFR BLD CALC: 41.1 % (ref 37–47)
HEMOGLOBIN: 13.3 GM/DL (ref 12.5–16)
IMMATURE NEUTROPHIL %: 0.3 % (ref 0–0.43)
KETONES, URINE: NEGATIVE MG/DL
LACTATE: 1.5 MMOL/L (ref 0.4–2)
LEUKOCYTE ESTERASE, URINE: NEGATIVE
LYMPHOCYTES ABSOLUTE: 2.5 K/CU MM
LYMPHOCYTES RELATIVE PERCENT: 28.1 % (ref 24–44)
MCH RBC QN AUTO: 31.3 PG (ref 27–31)
MCHC RBC AUTO-ENTMCNC: 32.4 % (ref 32–36)
MCV RBC AUTO: 96.7 FL (ref 78–100)
MONOCYTES ABSOLUTE: 0.7 K/CU MM
MONOCYTES RELATIVE PERCENT: 8 % (ref 0–4)
MUCUS: ABNORMAL HPF
NITRITE URINE, QUANTITATIVE: NEGATIVE
NUCLEATED RBC %: 0 %
OPIATES, URINE: NEGATIVE
OXYCODONE: NEGATIVE
PDW BLD-RTO: 11.7 % (ref 11.7–14.9)
PH, URINE: 6.5 (ref 5–8)
PHENCYCLIDINE, URINE: NEGATIVE
PLATELET # BLD: 256 K/CU MM (ref 140–440)
PMV BLD AUTO: 8.4 FL (ref 7.5–11.1)
POTASSIUM SERPL-SCNC: 4.6 MMOL/L (ref 3.5–5.1)
PROTEIN UA: NEGATIVE MG/DL
RBC # BLD: 4.25 M/CU MM (ref 4.2–5.4)
RBC URINE: 1 /HPF (ref 0–6)
SALICYLATE LEVEL: <0.3 MG/DL (ref 15–30)
SARS-COV-2, NAAT: NOT DETECTED
SEGMENTED NEUTROPHILS ABSOLUTE COUNT: 5.5 K/CU MM
SEGMENTED NEUTROPHILS RELATIVE PERCENT: 62.1 % (ref 36–66)
SODIUM BLD-SCNC: 139 MMOL/L (ref 135–145)
SOURCE: NORMAL
SPECIFIC GRAVITY UA: 1.01 (ref 1–1.03)
SQUAMOUS EPITHELIAL: <1 /HPF
T4 FREE: 0.94 NG/DL (ref 0.9–1.8)
TOTAL IMMATURE NEUTOROPHIL: 0.03 K/CU MM
TOTAL NUCLEATED RBC: 0 K/CU MM
TOTAL PROTEIN: 6.8 GM/DL (ref 6.4–8.2)
TSH HIGH SENSITIVITY: 5.36 UIU/ML (ref 0.27–4.2)
UROBILINOGEN, URINE: NORMAL MG/DL (ref 0.2–1)
WBC # BLD: 8.8 K/CU MM (ref 4–10.5)
WBC UA: ABNORMAL /HPF (ref 0–5)

## 2022-03-26 PROCEDURE — 85025 COMPLETE CBC W/AUTO DIFF WBC: CPT

## 2022-03-26 PROCEDURE — 83605 ASSAY OF LACTIC ACID: CPT

## 2022-03-26 PROCEDURE — 84439 ASSAY OF FREE THYROXINE: CPT

## 2022-03-26 PROCEDURE — 80053 COMPREHEN METABOLIC PANEL: CPT

## 2022-03-26 PROCEDURE — 82140 ASSAY OF AMMONIA: CPT

## 2022-03-26 PROCEDURE — 70450 CT HEAD/BRAIN W/O DYE: CPT

## 2022-03-26 PROCEDURE — 80307 DRUG TEST PRSMV CHEM ANLYZR: CPT

## 2022-03-26 PROCEDURE — 84443 ASSAY THYROID STIM HORMONE: CPT

## 2022-03-26 PROCEDURE — G0480 DRUG TEST DEF 1-7 CLASSES: HCPCS

## 2022-03-26 PROCEDURE — 87635 SARS-COV-2 COVID-19 AMP PRB: CPT

## 2022-03-26 PROCEDURE — 71045 X-RAY EXAM CHEST 1 VIEW: CPT

## 2022-03-26 PROCEDURE — 99285 EMERGENCY DEPT VISIT HI MDM: CPT

## 2022-03-26 PROCEDURE — 81001 URINALYSIS AUTO W/SCOPE: CPT

## 2022-03-26 NOTE — ED NOTES
Spoke with patient's father re: recommendation that patient be referred to a psychiatric hospital to further evaluate and treat her behaviors related to dementia. He is in agreement with plan. Advised him that he would be notified once placement has been found.      EFREN Emerson  03/26/22 7058

## 2022-03-26 NOTE — ED NOTES
[phone] Jamie John Paul Engel's phone number. Patient father would like to be called for updates     Leyla Baird.  Randa  03/26/22 9239
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
## 2022-03-26 NOTE — ED NOTES
Patient needs referral to [facility] once medically cleared and COVID tested. Please make sure [facility] is aware that patient will need facility with a geriatric unit to address behaviors related to dementia. Please notify patient's father, Charlie Palacio (#[phone]) once placement is found.      EFREN Yancey  03/26/22 2869
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
## 2022-03-27 VITALS
BODY MASS INDEX: 18.56 KG/M2 | TEMPERATURE: 98.4 F | HEART RATE: 98 BPM | RESPIRATION RATE: 18 BRPM | WEIGHT: 115 LBS | DIASTOLIC BLOOD PRESSURE: 81 MMHG | OXYGEN SATURATION: 98 % | SYSTOLIC BLOOD PRESSURE: 130 MMHG

## 2022-03-27 PROCEDURE — 99284 EMERGENCY DEPT VISIT MOD MDM: CPT | Performed by: PSYCHIATRY & NEUROLOGY

## 2022-03-27 NOTE — ED NOTES
Patient remains on suicide precautions. 1:1  remains in the room at this time  . Room has been checked and safety measures remain in place.        Kate Cabrera RN  03/27/22 9863

## 2022-03-27 NOTE — ED NOTES
Patient remains on suicide precautions. 1:1  remains in the room at this time  . Room has been checked and safety measures remain in place. Pt resting eyes closed at this time.      Ministerio Lynn RN  03/27/22 9959
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
## 2022-03-27 NOTE — ED NOTES
Patient remains on suicide precautions. 1:1  remains in the room at this time  . Room has been checked and safety measures remain in place. Pt resting eyes closed at this time.      Aleta Hernandez RN  03/27/22 [id number]

## 2022-03-27 NOTE — ED NOTES
Patient remains on suicide precautions. 1:1  remains in the room at this time  . Room has been checked and safety measures remain in place. Pt resting eyes closed at this time.      Arlene Christianson RN  03/27/22 1476

## 2022-03-27 NOTE — ED NOTES
Patient remains on suicide precautions. 1:1  remains in the room at this time  . Room has been checked and safety measures remain in place. Pt resting eyes closed at this time.      Leticia Young RN  03/27/22 6021
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
## 2022-03-28 NOTE — ED NOTES
Pt wandered out of room, was able to be re-directed back into room without incident.       Lisa Martines RN  03/28/22 0496

## 2022-03-28 NOTE — ED NOTES
Attempted to call report, was advised to call back once transport has been established.       Avni Arteaga RN  03/28/22 8725

## 2022-03-28 NOTE — ED PROVIDER NOTES
03/27/22p.terell Mehta was checked out to me by Dr. Timmy Tavarez. Please see his/her initial documentation for details of the patient's ED presentation, physical exam and completed studies. In brief, Bernard Mehta is a 62 y.o. female that presents with complaint of mental health problem, dementia awaiting placement seen by [facility].     I have reviewed and interpreted all of the currently available lab results from this visit (if applicable):  Results for orders placed or performed during the hospital encounter of 03/26/22   COVID-19, Rapid    Specimen: Nasopharyngeal   Result Value Ref Range    Source THROAT     SARS-CoV-2, NAAT NOT DETECTED NOT DETECTED   CBC with Auto Differential   Result Value Ref Range    WBC 8.8 4.0 - 10.5 K/CU MM    RBC 4.25 4.2 - 5.4 M/CU MM    Hemoglobin 13.3 12.5 - 16.0 GM/DL    Hematocrit 41.1 37 - 47 %    MCV 96.7 78 - 100 FL    MCH 31.3 (H) 27 - 31 PG    MCHC 32.4 32.0 - 36.0 %    RDW 11.7 11.7 - 14.9 %    Platelets 120 577 - 357 K/CU MM    MPV 8.4 7.5 - 11.1 FL    Differential Type AUTOMATED DIFFERENTIAL     Segs Relative 62.1 36 - 66 %    Lymphocytes % 28.1 24 - 44 %    Monocytes % 8.0 (H) 0 - 4 %    Eosinophils % 0.9 0 - 3 %    Basophils % 0.6 0 - 1 %    Segs Absolute 5.5 K/CU MM    Lymphocytes Absolute 2.5 K/CU MM    Monocytes Absolute 0.7 K/CU MM    Eosinophils Absolute 0.1 K/CU MM    Basophils Absolute 0.1 K/CU MM    Nucleated RBC % 0.0 %    Total Nucleated RBC 0.0 K/CU MM    Total Immature Neutrophil 0.03 K/CU MM    Immature Neutrophil % 0.3 0 - 0.43 %   Comprehensive Metabolic Panel w/ Reflex to MG   Result Value Ref Range    Sodium 139 135 - 145 MMOL/L    Potassium 4.6 3.5 - 5.1 MMOL/L    Chloride 100 99 - 110 mMol/L    CO2 26 21 - 32 MMOL/L    BUN 19 6 - 23 MG/DL    CREATININE 0.8 0.6 - 1.1 MG/DL    Glucose 97 70 - 99 MG/DL    Calcium 9.2 8.3 - 10.6 MG/DL    Albumin 4.2 3.4 - 5.0 GM/DL    Total Protein 6.8 6.4 - 8.2 GM/DL    Total Bilirubin 0.3 0.0 - 1.0 MG/DL ALT 17 10 - 40 U/L    AST 26 15 - 37 IU/L    Alkaline Phosphatase 56 40 - 129 IU/L    GFR Non-African American >60 >60 mL/min/1.73m2    GFR African American >60 >60 mL/min/1.73m2    Anion Gap 13 4 - 16   Ethanol   Result Value Ref Range    Alcohol Scrn <0.01 <0.01 %WT/VOL   Lactic Acid   Result Value Ref Range    Lactate 1.5 0.4 - 2.0 mMOL/L   DRUG SCREEN MULTI URINE   Result Value Ref Range    Cannabinoid Scrn, Ur NEGATIVE NEGATIVE    Amphetamines NEGATIVE NEGATIVE    Cocaine Metabolite NEGATIVE NEGATIVE    Benzodiazepine Screen, Urine NEGATIVE NEGATIVE    Barbiturate Screen, Ur NEGATIVE NEGATIVE    Opiates, Urine NEGATIVE NEGATIVE    Phencyclidine, Urine NEGATIVE NEGATIVE    Oxycodone NEGATIVE NEGATIVE   Urinalysis   Result Value Ref Range    Color, UA YELLOW YELLOW    Clarity, UA CLEAR CLEAR    Glucose, Urine NEGATIVE NEGATIVE MG/DL    Bilirubin Urine NEGATIVE NEGATIVE MG/DL    Ketones, Urine NEGATIVE NEGATIVE MG/DL    Specific Gravity, UA 1.010 1.001 - 1.035    Blood, Urine SMALL (A) NEGATIVE    pH, Urine 6.5 5.0 - 8.0    Protein, UA NEGATIVE NEGATIVE MG/DL    Urobilinogen, Urine NORMAL 0.2 - 1.0 MG/DL    Nitrite Urine, Quantitative NEGATIVE NEGATIVE    Leukocyte Esterase, Urine NEGATIVE NEGATIVE    RBC, UA 1 0 - 6 /HPF    WBC, UA NONE SEEN 0 - 5 /HPF    Bacteria, UA NEGATIVE NEGATIVE /HPF    Squam Epithel, UA <1 /HPF    Mucus, UA RARE (A) NEGATIVE HPF   TSH without Reflex   Result Value Ref Range    TSH, High Sensitivity 5.360 (H) 0.270 - 4.20 uIu/ml   T4, free   Result Value Ref Range    T4 Free 0.94 0.9 - 1.8 NG/DL   Ammonia   Result Value Ref Range    Ammonia 12 11 - 51 UMOL/L   Acetaminophen (TYLENOL) level   Result Value Ref Range    Acetaminophen Level <5.0 (L) 15 - 30 ug/ml    DOSE AMOUNT DOSE AMT. GIVEN - UNKNOWN     DOSE TIME DOSE TIME GIVEN - UNKNOWN    Salicylate   Result Value Ref Range    Salicylate Lvl <6.2 (L) 15 - 30 MG/DL    DOSE AMOUNT DOSE AMT.  GIVEN - UNKNOWN     DOSE TIME DOSE TIME GIVEN - UNKNOWN        MDM:    Patient with complaint of mental health problem, dementia seen by mental health medically cleared awaiting placement. I will sign out patient to Dr. Mushtaq Gtz she has been stable during my stay. Final Impression:  1. Dementia with behavioral disturbance, unspecified dementia type ([facility] [address]    2.  Memory loss or impairment        (Please note that portions of this note may have been completed with a voice recognition program. Efforts were made to edit the dictations but occasionally words are mis-transcribed.)    Kathi Whitney [address], DO  03/27/22 8202

## 2022-03-28 NOTE — ED PROVIDER NOTES
Patient is endorsed to me by Dr. Yosi Strickland at [facility]. In short, patient presented with psychosis with history of dementia with behavioral disturbance. The patient was placed in suicide precautions, patient's clothing and belongings were removed, documented and stored in the emergency department. Patient was reported to me to be medically cleared. I have examined the patient and noted a normal exam and stable vitals. Mental health have evaluated the patientand haverecommended that the patient be transferred to a inpatient psychiatric facility. We are currently awaiting placement for the patient. 0600:a.m.  I have signed out Brittany Engel's Emergency Department care to Dr. Ritika Pike. We discussed the pertinent history, physical exam, completed/pending test results (if applicable) and current treatment plan. Please refer to his/her chart for the patients remaining Emergency Department course and final disposition.               Shantel Rodríguez MD  03/28/22 7901

## 2022-03-28 NOTE — ED NOTES
[facility] called with transportation information. [address] [facility] with a  time of 1030.      Cathy Lopez  03/28/22 7355

## 2024-05-29 ENCOUNTER — APPOINTMENT (OUTPATIENT)
Dept: CT IMAGING | Age: 64
End: 2024-05-29
Payer: MEDICAID

## 2024-05-29 ENCOUNTER — HOSPITAL ENCOUNTER (EMERGENCY)
Age: 64
Discharge: NURSING, REHAB OR LONG-TERM CARE | End: 2024-05-29
Attending: EMERGENCY MEDICINE
Payer: MEDICAID

## 2024-05-29 ENCOUNTER — APPOINTMENT (OUTPATIENT)
Dept: GENERAL RADIOLOGY | Age: 64
End: 2024-05-29
Payer: MEDICAID

## 2024-05-29 VITALS
BODY MASS INDEX: 18.56 KG/M2 | TEMPERATURE: 97.9 F | HEART RATE: 72 BPM | OXYGEN SATURATION: 99 % | RESPIRATION RATE: 13 BRPM | WEIGHT: 115 LBS | DIASTOLIC BLOOD PRESSURE: 67 MMHG | SYSTOLIC BLOOD PRESSURE: 140 MMHG

## 2024-05-29 DIAGNOSIS — S00.01XA ABRASION OF SCALP, INITIAL ENCOUNTER: ICD-10-CM
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
## 2024-05-29 DIAGNOSIS — S00.03XA HEMATOMA OF SCALP, INITIAL ENCOUNTER: ICD-10-CM

## 2024-05-29 DIAGNOSIS — S09.90XA CLOSED HEAD INJURY, INITIAL ENCOUNTER: ICD-10-CM

## 2024-05-29 PROCEDURE — 72170 X-RAY EXAM OF PELVIS: CPT

## 2024-05-29 PROCEDURE — 99284 EMERGENCY DEPT VISIT MOD MDM: CPT

## 2024-05-29 PROCEDURE — 72125 CT NECK SPINE W/O DYE: CPT

## 2024-05-29 PROCEDURE — 71045 X-RAY EXAM CHEST 1 VIEW: CPT

## 2024-05-29 PROCEDURE — 70450 CT HEAD/BRAIN W/O DYE: CPT

## 2024-05-29 ASSESSMENT — PAIN - FUNCTIONAL ASSESSMENT: PAIN_FUNCTIONAL_ASSESSMENT: ADULT NONVERBAL PAIN SCALE (NPVS)

## 2024-05-29 NOTE — ED NOTES
Jaky at [facility] notified pt is on her way back with [facility], results and dc instructions reviewed.

## 2024-05-29 NOTE — ED PROVIDER NOTES
extremities      I have reviewed and interpreted all of the currently available lab results from this visit (if applicable):  No results found for this visit on 05/29/24.   Radiographs (if obtained):  Radiologist's Report Reviewed:  No results found.      MDM:    CC/HPI Summary, DDx, ED Course, and Reassessment: Patient presents after fall, did strike her head, has small scalp laceration.  We will get a better look at this after we can clear her C-spine.  She has severe dementia and does not really answer many questions but does not appear to have pain otherwise on exam.  As she is not a reliable historian we are obtaining CT head and C-spine as well as pelvic x-ray and chest x-ray.      History from : EMS    Limitations to history : severe dementia    Patient was given the following medications:  Medications - No data to display    Chronic conditions affecting care: Dementia    Social Determinants : Lives at nursing home    Records Reviewed : External ED Note patient was seen February 18, 2024 at [place] emergency department for very similar sounding fall.  Witnessed fall, walking and tripped, struck her head.  She is on Aricept for dementia, no blood thinners.      Disposition Considerations (tests considered but not done, Shared Decision Making, Pt Expectation of Test or Tx.):     Patient presented with witnessed fall.  No evidence of intracranial hemorrhage or surgical fractures or abnormality, she is in no distress here, small Scalp hematoma with abrasion but does not appear to require any repair, will plan will be for discharge back to nursing home  Appropriate for outpatient management      ED Course as of 05/29/24 1519   Wed May 29, 2024   0719 MARNI Santana tried to call patient's family contacts on nursing home paperwork and left message [KA]      ED Course User Index  [KA] Nellie Kelley MD           Discharge condition: stable    I am the Primary Clinician of Record.      Clinical Impression:  1. Fall,

## 2024-05-29 NOTE — ED NOTES
Tried calling [facility] emergency contacts. No answer but did leave messages requesting a call back

## 2024-05-29 NOTE — ED NOTES
0901 called [facility] for BLS unit to transport returning patient to [facility]. Spoke with Gerry at dispatch. ETA for  scheduled for 0920.